# Patient Record
Sex: FEMALE | Race: WHITE | URBAN - METROPOLITAN AREA
[De-identification: names, ages, dates, MRNs, and addresses within clinical notes are randomized per-mention and may not be internally consistent; named-entity substitution may affect disease eponyms.]

---

## 2017-06-26 ENCOUNTER — HOSPITAL ENCOUNTER (OUTPATIENT)
Dept: SPEECH THERAPY | Facility: CLINIC | Age: 72
Setting detail: THERAPIES SERIES
End: 2017-06-26
Attending: PEDIATRICS
Payer: MEDICARE

## 2017-06-26 PROCEDURE — G9174 SPEECH LANG CURRENT STATUS: HCPCS | Mod: GN,CL | Performed by: SPEECH-LANGUAGE PATHOLOGIST

## 2017-06-26 PROCEDURE — 92522 EVALUATE SPEECH PRODUCTION: CPT | Mod: GN | Performed by: SPEECH-LANGUAGE PATHOLOGIST

## 2017-06-26 PROCEDURE — 92507 TX SP LANG VOICE COMM INDIV: CPT | Mod: GN | Performed by: SPEECH-LANGUAGE PATHOLOGIST

## 2017-06-26 PROCEDURE — 40000230 ZZH STATISTIC SPEECH FACIAL PARALYSIS VISIT: Performed by: SPEECH-LANGUAGE PATHOLOGIST

## 2017-06-26 PROCEDURE — G9175 SPEECH LANG GOAL STATUS: HCPCS | Mod: GN,CJ | Performed by: SPEECH-LANGUAGE PATHOLOGIST

## 2017-06-26 NOTE — PROGRESS NOTES
[facility]          OUTPATIENT SPEECH LANGUAGE PATHOLOGY FACIAL PARALYSIS EVALUATION  PLAN OF TREATMENT FOR OUTPATIENT REHABILITATION  (COMPLETE FOR INITIAL CLAIMS ONLY)  Patient's Last Name, First Name, M.I.  YOB: 1945  Charmaine Adler                        Provider s Name: [facility] Medical Record No.  [id number]     Onset Date:  5/30/17     Start of Care Date: 06/26/17   Type:     ___PT  ___OT   _X_SLP    Medical Diagnosis:  Other Speech Disturbances   Speech Language Pathology Diagnosis:  Dysarthria, Nonverbal Communication Impairment, Other Speech Disturbances     Visits from SOC: 1    _________________________________________________________________________________  Plan of Treatment/Functional Goals:   Planned Therapy Interventions: Facial Therapy, Improve Facial Tone and Function, Improve Speech Intelligibilty           Goals   1. Goal Identifier: Facial Comfort       Goal Description: Patient will report a 25% increase in facial comfort/reduction in facial tightness as compared to status noted on date of evaluation (6/26/17).       Target Date: 08/26/17   2. Goal Identifier: Nonverbal Communication       Goal Description: Patient will demonstrate a 10 point gain on her Facial Grading Score, per therapist judgement, reflecting gains in orofacial resting tone, voluntary movement and minimization of synkinesis if present.        Target Date: 08/26/17   3. Goal Identifier: Speech       Goal Description: Patient will report a 25% improvement in ability to produce f,v sounds as compared to status noted on date of evaluation (6/26/17).        Target Date: 08/26/17                      Emma Ruby, SLP       I CERTIFY THE NEED FOR THESE SERVICES FURNISHED UNDER        THIS PLAN OF TREATMENT AND WHILE UNDER MY CARE .             Physician Signature                "Date    X_____________________________________________________                        Certification Date From:   06/26/17  Certification Date To:   09/23/17            Referring Physician: Velvet Handley MD    Initial Assessment        See Epic Evaluation Start of Care Date: 06/26/17                   Speech Pathology/Facial Paralysis Initial Evaluation - 06/26/17 1300   Visit Type   Visit Type Initial   Patient Type   Patient Type Adult   General Patient Information   Start Of Care Date 06/26/17   Referring Physician Velvet Handley MD   Orders Eval And Treat   Orders Date 05/30/17   Medical Diagnosis Other Speech Disturbances   Onset Of Illness/injury Or Date Of Surgery (January 2001)   Surgical/Medical History Reviewed (Per patient report, medical histroy unavailable at time of evaluation.)   Pertinent History Of Current Problem Patient previously seen in this clinic in April, 2001 - did not return following initial visit apparently due to issues with insurance and getting orders for treatment. Initially was diagnosed with [place] Syndrome in 2001, had shingles with onset, experienced vertigo, treated with meds (Gabapentin, prednisone, antiviral), saw neurologist, had MRI (\"It showed I had shingles.\"), found that Imitrex helped. \"After about a month, I could drive. I took major doses of Vit B and it helped. Then I came to your clinic, you gave me the exercises and I saw some change. Always wanted to come back but was unable due to insurance/order issues.\"    Functional Problems Current concerns: \"I can't wear my left contact, I can't wear it as long as the right, I tend to wear the right one without the left one.\" Uses drops occasionally. \"The left side of my face doesn't work right, I would really like to have better pictures, there's tightness in my cheek, drooling on the left occasionally, I can't whistle.\"   Previous Treatment (One visit in this clinic, PT at [facility] 4-5 visits.)   General Health " Headaches;Ear infections;Other  (Exposed to cold sore of grandson's one week prior to onset.)   Diagnostic Tests MRI  (Consistent with shingles)   Occupation PT     Sensory Changes Tightness   Pain Description None   Hearing Changes Loss   Eye Problems Dry eye  (Bilateral lower lid blephoroplasty on 8/31/06)   Oral Habits None   Patient's Concerns dry eye;difficulty drinking ;other (comment)  (Impaired nonverbal communication)   Patient/Family Goals Improve above noted issues   Evaluation Results: Resting Tone and Symmetry/Oral status   Palpebral Fissure - Type of Eye Tone  Wide  (3.5 mm)   Nasolabial Fold  More Pronounced   Lips  - Type of Lip Tone  Normal   Chin  - Type of Chin Tone  [place]   Type of Forehead Wrinkles Less   Type of Eye Wrinkles Less   Type of Cheek/Mouth Wrinkles More   Oral Rest Posture Anterior, dentalized   Evaluation Results: Forehead Elevation   Forehead Strength Rating % 30%   Forehead - Synkinesis  Orbicularis Occuli;Risorius;Mentalis;Depressors;Platysma   Forehead General Severity of Synkinesis   moderate to severe   Evaluation Results: Minimal Effort Eye Closure    Complete No  (Lacks 1 mm)   Eye Closure Synkinesis   Orbicularis Occuli;Mentalis;Platysma   General Severity of Synkinesis   mild to moderate   Evaluation Results: Open Mouth Smile    Open Smile Strength Rating % 30%   Open Smile Synkinesis   Orbicularis Occuli;Mentalis;Platysma   Open Smile General Severity of Synkinesis   moderate   Evaluation Results: Closed Mouth Smile    Closed Mouth Smile Strength Rating % 70%   Closed Mouth Smile Synkinesis   Orbicularis Occuli;Mentalis;Platysma   Closed Mouth Smile General Severity of Synkinesis   moderate   Evaluation Results: Snarl   Snarl Strength Rating % 45%   Snarl Synkinesis  Orbicularis Occuli;Risorius;Mentalis;Platysma   Snarl General Severity of Synkinesis   moderate to severe   Evaluation Results: Smirk   Smirk Strength rating % 50%   Smirk Synkinesis   Orbicularis Occuli;Mentalis;Platysma   Smirk General Severity of Synkinesis   mild   Evaluation Results: Pucker   Strength Rating % 45%   Pucker Synkinesis   Orbicularis Occuli;Levators;Risorius;Mentalis;Platysma   General Severity of Synkinesis   moderate to severe   Evaluation Results: Compression   Strength Rating % 75%   Compression Synkinesis   Orbicularis Occuli;Zygomaticus;Risorius;Mentalis;Platysma   General Severity of Synkinesis   mild   Evaluation Results: Contraction   Strength Rating % 80%   Contraction Synkinesis   Orbicularis Occuli;Zygomaticus;Risorius;Mentalis;Platysma   General Severity of Synkinesis   moderate to severe   Evaluation Results: Protrusion   Strength Rating % 45%   Protrusion Synkinesis   Orbicularis Occuli;Zygomaticus;Risorius;Mentalis;Platysma   General Severity of Synkinesis   moderate   Evaluation Results: Pout   Strength Rating % 50%   Pout Synkinesis   Orbicularis Occuli;Zygomaticus;Risorius;Mentalis;Platysma   General Severity of Synkinesis   moderate   Evaluation Results: Frown   Strength Rating % 0%   Frown Synkinesis   Orbicularis Occuli;Zygomaticus;Risorius;Mentalis;Platysma   General Severity of Synkinesis   moderate   Evaluation Results: Lower Lip Depression   Strength Rating % 50%   Lower Lip Depression Synkinesis   Orbicularis Occuli;Zygomaticus;Risorius;Mentalis;Platysma   General Severity of Synkinesis   mild to moderate   Evaluation Results: Chin Tone (Mentalis)   Strength Rating % 80%   Chin Tone (Mentalis) Synkinesis  Orbicularis Occuli;Zygomaticus;Risorius;Mentalis;Platysma   General Severity of Synkinesis   moderate to severe   Evaluation Results: Tongue Movement   Tongue Protrusion Deviates to left   Elevation/Depression Extraorally Deviates to left on elevation;Deviates to left on depression  (Slight)   Elevation/Depression Intraorally Deviates to left on elevation;Deviates to left on depression  (Slight)   Presence of Synkinesis with Lingual Movements Mild in  orbicularis oculi   Evaluation Results: Speech Function   Evaluation Results: Speech Function Deviation of lips during speech to right, stronger side;Reduced lip movement on the left;Reduced dissociation of lingual, labial, and jaw musculature   Synkinesis with Sound-Lip Rounding Orbicularis Occuli;Zygomaticus;Mentalis;Platysma   General Severity of Synkinesis with Sound-Lip Rounding Moderate   Synkinesis with Sound-Lip Pressure Orbicularis Occuli;Zygomaticus;Mentalis;Platysma   General Severity of Synkinesis with Sound-Lip Pressure moderate   General Therapy Interventions   Planned Therapy Interventions Facial Therapy;Improve Facial Tone and Function;Improve Speech Intelligibilty   Clinical Impressions   Criteria for Skilled Therapeutic Interventions Met yes;treatment indicated   Facial Grading Score 35.55/100   House-Brackmann Score IV/VI   Communication Diagnosis Dysarthria, Nonverbal Communication Impairment   Rehab Potential good to achieve stated therapy goal(s)   Therapy Frequency  (1 x/2weeks for one visit,3 weeks for next visit,then monthly)   Predicted Duration of Therapy Intervention (days/weeks) 12 months   Risks and Benefits of Treatment have been explained yes   Patient, family and/or staff in agreement yes   Facial Paralysis Goals   Facial Paralysis Goals 1;2;3   Facial Paralysis Goal 1   Goal Identifier Facial Comfort   Goal Description Patient will report a 25% increase in facial comfort/reduction in facial tightness as compared to status noted on date of evaluation (6/26/17).   Target Date 08/26/17   Facial Paralysis Goal 2   Goal Identifier Nonverbal Communication   Goal Description Patient will demonstrate a 10 point gain on her Facial Grading Score, per therapist judgement, reflecting gains in orofacial resting tone, voluntary movement and minimization of synkinesis if present.    Target Date 08/26/17   Facial Paralysis Goal 3   Goal Identifier Speech   Goal Description Patient will report a 25%  improvement in ability to produce f,v sounds as compared to status noted on date of evaluation (6/26/17).    Target Date 08/26/17   Education Assessment   Preferred Learning Style Listening;Reading;Demonstration;Pictures/video   Barriers to Learning No barriers   Total Evaluation Time   Total Evaluation Time 45

## 2017-07-12 ENCOUNTER — HOSPITAL ENCOUNTER (OUTPATIENT)
Dept: SPEECH THERAPY | Facility: CLINIC | Age: 72
Setting detail: THERAPIES SERIES
End: 2017-07-12
Attending: PEDIATRICS
Payer: MEDICARE

## 2017-07-12 PROCEDURE — 92507 TX SP LANG VOICE COMM INDIV: CPT | Mod: GN | Performed by: SPEECH-LANGUAGE PATHOLOGIST

## 2017-07-12 PROCEDURE — 40000230 ZZH STATISTIC SPEECH FACIAL PARALYSIS VISIT: Performed by: SPEECH-LANGUAGE PATHOLOGIST

## 2017-08-02 ENCOUNTER — HOSPITAL ENCOUNTER (OUTPATIENT)
Dept: SPEECH THERAPY | Facility: CLINIC | Age: 72
Setting detail: THERAPIES SERIES
End: 2017-08-02
Attending: PEDIATRICS
Payer: MEDICARE

## 2017-08-02 PROCEDURE — 92507 TX SP LANG VOICE COMM INDIV: CPT | Mod: GN | Performed by: SPEECH-LANGUAGE PATHOLOGIST

## 2017-08-02 PROCEDURE — 40000230 ZZH STATISTIC SPEECH FACIAL PARALYSIS VISIT: Performed by: SPEECH-LANGUAGE PATHOLOGIST

## 2017-08-30 ENCOUNTER — HOSPITAL ENCOUNTER (OUTPATIENT)
Dept: SPEECH THERAPY | Facility: CLINIC | Age: 72
Setting detail: THERAPIES SERIES
End: 2017-08-30
Attending: PEDIATRICS
Payer: MEDICARE

## 2017-08-30 PROCEDURE — 92507 TX SP LANG VOICE COMM INDIV: CPT | Mod: GN | Performed by: SPEECH-LANGUAGE PATHOLOGIST

## 2017-08-30 PROCEDURE — 40000230 ZZH STATISTIC SPEECH FACIAL PARALYSIS VISIT: Performed by: SPEECH-LANGUAGE PATHOLOGIST

## 2017-09-27 ENCOUNTER — HOSPITAL ENCOUNTER (OUTPATIENT)
Dept: SPEECH THERAPY | Facility: CLINIC | Age: 72
Setting detail: THERAPIES SERIES
End: 2017-09-27
Attending: PEDIATRICS
Payer: MEDICARE

## 2017-09-27 PROCEDURE — 92507 TX SP LANG VOICE COMM INDIV: CPT | Mod: GN | Performed by: SPEECH-LANGUAGE PATHOLOGIST

## 2017-09-27 PROCEDURE — 40000230 ZZH STATISTIC SPEECH FACIAL PARALYSIS VISIT: Performed by: SPEECH-LANGUAGE PATHOLOGIST

## 2017-09-27 NOTE — PROGRESS NOTES
"   Speech Pathology/Facial Paralysis Progress Summary - 09/27/17 1100   Signing Clinician's Name / Credentials   Signing clinician's name /credentials VENTURA Galvan, SLP   Session Number   Session Number 6   Progress/Recertification   Progress note due 09/26/17   Completed Date 09/27/17   Recertification Due 09/23/17  (Or at 10th visit)   Recertification Complete 09/27/17   Quick Add   Facial Paralysis Facial Paralysis   SLP Medicare Only G-code   G-code Other SLP Functional Limitation   Other SLP Functional Limitation   Other SLP Functional Limitation current status  (eval/re-eval & every progress note) CK: 40-59% impairment   Other SLP Functional Limitation Modifier Rationale Impaired orofacial tone/function for speech and nonverbal communication   Other SLP Functional Limitation Goal,  (eval/re-eval, every progress note & discharge) CJ: 20-39% impairment   Subjective Report   Subjective Report \"I can't tell what's changed, I don't think it's any worse.\"  (\"I notice more wrinkles now.\")   Functional Problems Not reporting very much discomfort in eye with tightening due to synkinesis, but still has dryness, uses drops as needed, \"probably should use them more\", most of the time contacts are comfortable but sometimes only wears right lens. (Recommended increasing use.) Still carrying out home practice.     Resting Symmetry Location    Palpebral Fissure Eye Tone Narrow  (1 mm, improved 1 mm)   Nasolabial Fold More Pronounced  (But reduced)   Lips Normal   Voluntary Movement: Minimal Effort Eye Closure    Minimal Effort Eye Closure Complete Yes  (Improved)   Minimal Effort Eye Closure-Synkinesis Risorius;Mentalis;Platysma   General Severity of Synkinesis (Slight to mild. Reduced, improved)   Voluntary Movement: Forehead   Forehead Elevation  Strength Rating % 40-45%  (Slightly improved)   Forehead-Synkinesis Orbicularis Occuli;Risorius;Mentalis;Depressors;Platysma   General Severity of Synkinesis " "mild  (Improved further)   Voluntary Movement: Open Mouth Smile   Open Mouth Smile Strength Rating% 50%  (Improved )   Open Mouth Smile-Synkinesis Orbicularis Occuli;Mentalis;Platysma   General Severity of Synkinesis mild  (Reduced further)   Voluntary Movement: Closed Mouth Smile   Closed Mouth Smile Strength Rating % 90%  (Improved )   Closed Mouth Smile-Synkinesis Orbicularis Occuli;Mentalis;Platysma   General Severity of Synkinesis mild to moderate  (Reduced somewhat)   Voluntary Movement: Snarl   Snarl Strength Rating % 55%-60%  (Slightly improved)   Snarl-Synkinesis Orbicularis Occuli;Risorius;Mentalis;Platysma   General Severity of Synkinesis mild to moderate  (Reduced)   Voluntary Movement: Pucker   Pucker Strength Rating % 70%  (Improved slighlty)   Pucker-Synkinesis Orbicularis Occuli; Levators;Zygomaticus;Risorius;Mentalis;Platysma   General Severity of Synkinesis mild to moderate  (But reduced somewhat)   Facial Paralysis Goals   Facial Paralysis Goals 1;2;3   Facial Paralysis Goal 1   Goal Identifier Facial Comfort   Goal Description Patient will report a 25% increase in facial comfort/reduction in facial tightness as compared to status noted on date of evaluation (6/26/17).  (Self-rating at 20% improved)   Target Date 08/26/17   Date Met 09/27/17  (\"It)   Facial Paralysis Goal 2   Goal Identifier Nonverbal Communication   Goal Description Patient will demonstrate a 10 point gain on her Facial Grading Score, per therapist judgement, reflecting gains in orofacial resting tone, voluntary movement and minimization of synkinesis if present.   (Increased 11.2/10 points, surpassed goal again since 8/2/17)   Target Date 10/26/17   Date Met 09/27/17  (Continuing goal for another 5 points in 2 months)   Facial Paralysis Goal 3   Goal Identifier Speech   Goal Description Patient will report a 25% improvement in ability to produce f,v sounds as compared to status noted on date of evaluation (6/26/17). "   (Self-rating at 10% improved)   Target Date 08/26/17  (Eye tightens (synkinesis))   Date Met (Continuing for another 25% (50% improved))   Treatment Interventions    Treatment Interventions Treatment Speech/Lang/Voice;Facial Paralysis-Massage/Stretch;Facial Paralysis-Strengthening   Treatment Speech/Lang/Voice   Skilled Intervention Provided written and verbal information on.;Provided feedback on performance of tasks   Patient Response Demonstrated and verbalized understanding   Treatment Detail Introduced and practiced orofacial exercises to improve labial tone/ROM for f,v sound production (see Massage section below).    Massage/Stretch   Minutes 5   Skilled Intervention Instructed in massage strategies to reduce excessive facial tone/synkinesis.   Patient Response Demonstrated and verbalized understanding   Treatment Detail Reviewed and practiced: wheel, buccinator, cheek pulls.   (Added eyelid stretch and platysma stretch; added chin)   Progress Reduced trigger points noted during massage.    Strengthening   Minutes 50   Skilled Intervention Reassessed orofacial status for speech, oral stage swallowing and nonverbal communication. Advanced home program per below.   Patient Response Demonstrated and verbalized understanding   Progress See change in Facial Grading Score.   Snarl (On hold)   Smile Open Mouth Active Assisted;Include Eye Shift  (Instsructed to use more fingers)   Smile Closed Mouth (On hold)   Pucker Active Assisted;Include Eye Shift  (Reinstructed again)   Facial Relaxation Active   Eye Closure Active ;With Relaxation of Lower Face;Other  (Optional)   Assessments Completed   Facial Grading Score 59.7/100   House-Brackmann Score III-IV/VI   Education   Learner Patient   Readiness Eager   Method Booklet/handout;Literature;Explanation;Demonstration   Response Verbalizes understanding;Demonstrates understanding   Education Notes Home practiced minimized today due to gains to provide somewhat of a  "\"break\" and allow for longevity in ongoing home practice.    Plan   Home program See Massage and Strengthening sections below.   Plan for next session Reassess status and advance home practice program as indicated.    Comments   Comments Reports significantly improved comfort from massage, lump still present in cheek but smaller.   Total Session Time   Total Treatment Time (sum of timed and untimed services) 55   AMBULATORY CLINICS ONLY-MEDICAL AND TREATMENT DIAGNOSIS   Medical Diagnosis Other Speech Disturbances   Communication Diagnosis Dysarthria, Nonverbal Communication Impairment     "

## 2017-09-27 NOTE — PROGRESS NOTES
[facility]          OUTPATIENT SPEECH LANGUAGE PATHOLOGY FACIAL PARALYSIS EVALUATION  PLAN OF TREATMENT FOR OUTPATIENT REHABILITATION    Patient's Last Name, First Name, M.I.  YOB: 1945  Charmaine Adler                        Provider s Name: [facility] Medical Record No.  [id number]     Onset Date:      Start of Care Date:  06/26/17   Type:     ___PT  ___OT   _X_SLP    Medical Diagnosis:  Other Speech Disturbances   Speech Language Pathology Diagnosis:  Dysarthria, Nonverbal Communication Impairment,  Other Speech Disturbances    Visits from SOC: 6    _________________________________________________________________________________  Plan of Treatment/Functional Goals:               Goals   1. Goal Identifier: Facial Comfort       Goal Description: Patient will report a 25% increase in facial comfort/reduction in facial tightness as compared to status noted on date of evaluation (6/26/17). (Self-rating at 20% improved)       Target Date: 08/26/17   2. Goal Identifier: Nonverbal Communication       Goal Description: Patient will demonstrate a 10 point gain on her Facial Grading Score, per therapist judgement, reflecting gains in orofacial resting tone, voluntary movement and minimization of synkinesis if present.  (Increased 11.2/10 points, surpassed goal again since 8/2/17)       Target Date: 10/26/17   3. Goal Identifier: Speech       Goal Description: Patient will report a 25% improvement in ability to produce f,v sounds as compared to status noted on date of evaluation (6/26/17).  (Self-rating at 10% improved)       Target Date: 08/26/17 (Eye tightens (synkinesis))           Emma Ruby, SLP       I CERTIFY THE NEED FOR THESE SERVICES FURNISHED UNDER        THIS PLAN OF TREATMENT AND WHILE UNDER MY CARE .             Physician Signature                Date    X_____________________________________________________                Velvet Handley MD          Certification Date From:   09/24/17  Certification Date To:   12/21/17                 Initial Assessment        See Epic Evaluation Start of Care Date:  06/26/17

## 2017-10-25 ENCOUNTER — HOSPITAL ENCOUNTER (OUTPATIENT)
Dept: SPEECH THERAPY | Facility: CLINIC | Age: 72
Setting detail: THERAPIES SERIES
End: 2017-10-25
Attending: PEDIATRICS
Payer: MEDICARE

## 2017-10-25 PROCEDURE — 40000230 ZZH STATISTIC SPEECH FACIAL PARALYSIS VISIT: Performed by: SPEECH-LANGUAGE PATHOLOGIST

## 2017-10-25 PROCEDURE — 92507 TX SP LANG VOICE COMM INDIV: CPT | Mod: GN | Performed by: SPEECH-LANGUAGE PATHOLOGIST

## 2017-11-29 ENCOUNTER — HOSPITAL ENCOUNTER (OUTPATIENT)
Dept: SPEECH THERAPY | Facility: CLINIC | Age: 72
Setting detail: THERAPIES SERIES
End: 2017-11-29
Attending: PEDIATRICS
Payer: MEDICARE

## 2017-11-29 PROCEDURE — 92507 TX SP LANG VOICE COMM INDIV: CPT | Mod: GN | Performed by: SPEECH-LANGUAGE PATHOLOGIST

## 2017-11-29 PROCEDURE — 40000230 ZZH STATISTIC SPEECH FACIAL PARALYSIS VISIT: Performed by: SPEECH-LANGUAGE PATHOLOGIST

## 2017-11-29 NOTE — ADDENDUM NOTE
Encounter addended by: Emma Ruby SLP on: 11/29/2017 11:41 AM<BR>     Actions taken: Flowsheet accepted

## 2018-01-10 ENCOUNTER — HOSPITAL ENCOUNTER (OUTPATIENT)
Dept: SPEECH THERAPY | Facility: CLINIC | Age: 73
Setting detail: THERAPIES SERIES
End: 2018-01-10
Attending: PEDIATRICS
Payer: MEDICARE

## 2018-01-10 PROCEDURE — 92507 TX SP LANG VOICE COMM INDIV: CPT | Mod: GN | Performed by: SPEECH-LANGUAGE PATHOLOGIST

## 2018-01-10 PROCEDURE — 40000230 ZZH STATISTIC SPEECH FACIAL PARALYSIS VISIT: Performed by: SPEECH-LANGUAGE PATHOLOGIST

## 2018-01-10 NOTE — PROGRESS NOTES
" Speech Pathology/Facial Paralysis Discharge Summary - 01/10/18 0900   Signing Clinician's Name / Credentials   Signing clinician's name /credentials VETNURA Galvan, SLP   Session Number   Session Number 9  (At 10th visit)   Progress/Recertification   Progress note due 12/21/17   Completed Date 01/10/18  (Patient last seen on 11/29/17)   Quick Add   Facial Paralysis Facial Paralysis   Subjective Report   Subjective Report \"I try to do something every day but I haven't been as good. I'm having a basal cell carcinoma removed from my leg on the 18th. I don't think there's been too much change (in face), I'm wondering if I went backward since I haven't been doing as much. We'll see what you find out today. It feels the same.\"   Resting Symmetry Location    Palpebral Fissure Eye Tone Narrow  (1 mm, improved)   Nasolabial Fold More Pronounced   Lips Normal   Voluntary Movement: Minimal Effort Eye Closure    Minimal Effort Eye Closure Complete No   Minimal Effort Eye Closure Lack in mm .5 mm (closes completely with eye gaze downward)   Minimal Effort Eye Closure-Synkinesis Risorius;Mentalis;Platysma   General Severity of Synkinesis (slight)   Voluntary Movement: Forehead   Forehead Elevation  Strength Rating % 50%   Forehead-Synkinesis Orbicularis Occuli;Zygomaticus;Risorius;Mentalis;Platysma   General Severity of Synkinesis mild   Voluntary Movement: Open Mouth Smile   Open Mouth Smile Strength Rating% 65-70%  (Slighlty improved)   Open Mouth Smile-Synkinesis Orbicularis Occuli;Mentalis;Platysma   General Severity of Synkinesis (Slight, and slightly improved)   Voluntary Movement: Closed Mouth Smile   Closed Mouth Smile Strength Rating % 90-95%  (Improved)   Closed Mouth Smile-Synkinesis Orbicularis Occuli;Mentalis;Platysma   General Severity of Synkinesis (Slight)   Voluntary Movement: Snarl   Snarl Strength Rating % 65%  (Slightly improved)   Snarl-Synkinesis Orbicularis Occuli;Mentalis;Platysma   General Severity " of Synkinesis mild  (Slightly improved)   Voluntary Movement: Pucker   Pucker Strength Rating % 75-80%  (Improved)   Pucker-Synkinesis Orbicularis Occuli; Levators;Zygomaticus;Mentalis;Platysma   General Severity of Synkinesis mild to moderate  (But improved)   Facial Paralysis Goals   Facial Paralysis Goals 1;2;3   Facial Paralysis Goal 1   Goal Identifier Facial Comfort   Goal Description Patient will report a 25% increase in facial comfort/reduction in facial tightness as compared to status noted on date of evaluation (6/26/17).   Target Date 08/26/17   Date Met 11/29/17  (Patient indicates she no longer focuses on her face frequently throughout the day.)   Facial Paralysis Goal 2   Goal Identifier Nonverbal Communication   Goal Description Patient will demonstrate a 5 point gain on her Facial Grading Score, per therapist judgement, reflecting gains in orofacial resting tone, voluntary movement and minimization of synkinesis if present.    Target Date 11/30/17  (Increased 5.2/5 points, surpassed goal)   Date Met 01/10/18   Facial Paralysis Goal 3   Goal Identifier Speech   Goal Description Patient will report an 80% improvement in ability to produce f,v sounds as compared to status noted on date of evaluation (6/26/17).    Target Date 12/31/17   Date Met 01/10/18   Treatment Interventions    Treatment Interventions Treatment Swallow/Oral dysfunction;Treatment Speech/Lang/Voice;Facial Paralysis-Massage/Stretch;Facial Paralysis-Strengthening   Treatment Speech/Lang/Voice   Skilled Intervention Provided written and verbal information on.;Provided feedback on performance of tasks   Patient Response Demonstrated and verbalized understanding   Treatment Detail Introduced and practiced orofacial exercises to improve labial tone/ROM for f,v sound production (see Massage section below).    Massage/Stretch   Skilled Intervention Reviewed given strategies see below.   Patient Response Demonstrated and verbalized  "understanding   Treatment Detail Reviewed massage strategies which patient feels are most effective. Patient will select as needed.  (pinches/wiggles above/below lips; neck stretch - optional)   Progress Decreased synkinesis  (Excellent considering reduction in practice.)   Strengthening   Minutes 35   Skilled Intervention Reassessed orofacial status for speech, oral stage swallowing and nonverbal communication. Advanced home program per below.   Patient Response Demonstrated and verbalized understanding   Progress Improved resting tone, improved ROM for smile, reduced sykinesis - see Facial Grading Scoring.   Eye Closure Active ;With Relaxation of Lower Face  (Optional)   Smile Open Mouth Active;Include Eye Shift  (Optional)   Smile Closed Mouth (Go to smile - Active - ongoing optional)   Pucker Active;Include Eye Shift  (Optional)   Facial Relaxation Active   Assessments Completed   Assessments Completed Facial Grading Score increased 33.1 points since beginning of treatment on 06/26/17, significant gains considering onset was in 2001.   Facial Grading Score 68.6/100  (Reduced synkinesis, improved tone, improved ROM for smile. No reported issues with speech or swallowing now.)   Education   Learner Patient   Readiness Eager   Method Booklet/handout;Literature;Explanation;Demonstration   Response Verbalizes understanding;Demonstrates understanding   Plan   Home program See Massage and Strengthening sections below.   Updates to plan of care Ready for discharge with ongoing independent home practice program.    Comments   Comments \"I was able to get rid of a tight lump here (nasolabial fold) - likely assisted with gains in smile movement..  (\"I've had a couple pictures come out pretty good.\")   Total Session Time   Total Treatment Time (sum of timed and untimed services) 35   AMBULATORY CLINICS ONLY-MEDICAL AND TREATMENT DIAGNOSIS   Medical Diagnosis Other Speech Disturbances   Communication Diagnosis Dysarthria, " Nonverbal Communication Impairment

## 2020-03-11 ENCOUNTER — OFFICE VISIT (OUTPATIENT)
Dept: URBAN - METROPOLITAN AREA CLINIC 18 | Facility: CLINIC | Age: 75
End: 2020-03-11
Payer: COMMERCIAL

## 2020-03-11 DIAGNOSIS — H25.13 AGE-RELATED NUCLEAR CATARACT, BILATERAL: Chronic | ICD-10-CM

## 2020-03-11 DIAGNOSIS — H16.223 KERATOCONJUNCT SICCA, NOT SPECIFIED AS SJOGREN'S, BILATERAL: Chronic | ICD-10-CM

## 2020-03-11 DIAGNOSIS — H43.813 VITREOUS DEGENERATION, BILATERAL: Primary | Chronic | ICD-10-CM

## 2020-03-11 PROCEDURE — 92004 COMPRE OPH EXAM NEW PT 1/>: CPT | Performed by: OPTOMETRIST

## 2020-03-11 ASSESSMENT — INTRAOCULAR PRESSURE
OD: 14
OS: 14

## 2020-03-11 ASSESSMENT — KERATOMETRY
OD: 44.50
OS: 44.75

## 2020-03-11 NOTE — IMPRESSION/PLAN
Impression: Keratoconjunct sicca, not specified as Sjogren's, bilateral: N35.410.  Plan: artificial tears 4-6 x daily OU

## 2020-03-11 NOTE — IMPRESSION/PLAN
Impression: Age-related nuclear cataract, bilateral: H25.13. Plan: Discussed diagnosis in detail with patient. There is no evidence of retinal pathology. No treatment is required at this time. Will continue to observe condition and or symptoms. Discussed signs and symptoms of PVD/floaters. Discussed signs and symptoms of retinal detachment/tears. Patient instructed to call the office immediately if any symptoms noted. Patient instructed to call if condition gets worse.